# Patient Record
Sex: FEMALE | Race: WHITE | URBAN - METROPOLITAN AREA
[De-identification: names, ages, dates, MRNs, and addresses within clinical notes are randomized per-mention and may not be internally consistent; named-entity substitution may affect disease eponyms.]

---

## 2017-02-28 ENCOUNTER — TRANSFERRED RECORDS (OUTPATIENT)
Dept: HEALTH INFORMATION MANAGEMENT | Facility: CLINIC | Age: 67
End: 2017-02-28

## 2017-03-14 ENCOUNTER — TRANSFERRED RECORDS (OUTPATIENT)
Dept: HEALTH INFORMATION MANAGEMENT | Facility: CLINIC | Age: 67
End: 2017-03-14

## 2017-03-14 LAB
ALT SERPL-CCNC: 16 IU/L (ref 8–45)
AST SERPL-CCNC: 25 IU/L (ref 2–40)
CREAT SERPL-MCNC: 0.8 MG/DL (ref 0.57–1.11)
GFR SERPL CREATININE-BSD FRML MDRD: >60 ML/MIN/1.73M2
GLUCOSE SERPL-MCNC: 95 MG/DL (ref 65–100)
POTASSIUM SERPL-SCNC: 4.1 MMOL/L (ref 3.5–5)

## 2017-03-23 DIAGNOSIS — M81.0 SENILE OSTEOPOROSIS: Primary | ICD-10-CM

## 2017-04-03 ENCOUNTER — INFUSION THERAPY VISIT (OUTPATIENT)
Dept: INFUSION THERAPY | Facility: CLINIC | Age: 67
End: 2017-04-03
Attending: FAMILY MEDICINE
Payer: COMMERCIAL

## 2017-04-03 ENCOUNTER — HOSPITAL ENCOUNTER (OUTPATIENT)
Facility: CLINIC | Age: 67
Setting detail: SPECIMEN
Discharge: HOME | End: 2017-04-03
Attending: FAMILY MEDICINE | Admitting: FAMILY MEDICINE
Payer: COMMERCIAL

## 2017-04-03 VITALS — DIASTOLIC BLOOD PRESSURE: 65 MMHG | TEMPERATURE: 98.2 F | HEART RATE: 67 BPM | SYSTOLIC BLOOD PRESSURE: 112 MMHG

## 2017-04-03 DIAGNOSIS — M81.0 SENILE OSTEOPOROSIS: Primary | ICD-10-CM

## 2017-04-03 DIAGNOSIS — Z92.29 PERSONAL HISTORY OF OTHER DRUG THERAPY: ICD-10-CM

## 2017-04-03 LAB
CALCIUM SERPL-MCNC: 9.4 MG/DL (ref 8.5–10.1)
CREAT SERPL-MCNC: 0.77 MG/DL (ref 0.52–1.04)
GFR SERPL CREATININE-BSD FRML MDRD: 74 ML/MIN/1.7M2

## 2017-04-03 PROCEDURE — 82310 ASSAY OF CALCIUM: CPT | Performed by: FAMILY MEDICINE

## 2017-04-03 PROCEDURE — 96372 THER/PROPH/DIAG INJ SC/IM: CPT

## 2017-04-03 PROCEDURE — 82565 ASSAY OF CREATININE: CPT | Performed by: FAMILY MEDICINE

## 2017-04-03 PROCEDURE — 36415 COLL VENOUS BLD VENIPUNCTURE: CPT

## 2017-04-03 PROCEDURE — 25000128 H RX IP 250 OP 636: Performed by: FAMILY MEDICINE

## 2017-04-03 RX ADMIN — DENOSUMAB 60 MG: 60 INJECTION SUBCUTANEOUS at 12:57

## 2017-04-03 ASSESSMENT — PAIN SCALES - GENERAL: PAINLEVEL: NO PAIN (0)

## 2017-04-03 NOTE — MR AVS SNAPSHOT
"              After Visit Summary   4/3/2017    Annamaria Rosario    MRN: [id number]           Patient Information     Date Of Birth          1950        Visit Information        Provider Department      4/3/2017 11:30 AM  INFUSION CHAIR 6 [facility] and [facility]        Today's Diagnoses     Senile osteoporosis    -  1    Failed oral bisphosphonates           Follow-ups after your visit        Who to contact     If you have questions or need follow up information about today's clinic visit or your schedule please contact [facility] AND [facility] directly at [phone].  Normal or non-critical lab and imaging results will be communicated to you by School Yourselfhart, letter or phone within 4 business days after the clinic has received the results. If you do not hear from us within 7 days, please contact the clinic through Periscape or phone. If you have a critical or abnormal lab result, we will notify you by phone as soon as possible.  Submit refill requests through Periscape or call your pharmacy and they will forward the refill request to us. Please allow 3 business days for your refill to be completed.          Additional Information About Your Visit        MyChart Information     Periscape lets you send messages to your doctor, view your test results, renew your prescriptions, schedule appointments and more. To sign up, go to [email or web address] . Click on \"Log in\" on the left side of the screen, which will take you to the Welcome page. Then click on \"Sign up Now\" on the right side of the page.     You will be asked to enter the access code listed below, as well as some personal information. Please follow the directions to create your username and password.     Your access code is: [email or web address]  Expires: 2017  2:23 PM     Your access code will  in 90 days. If you need help or a new code, please call your [place] clinic or [phone].        Care EveryWhere ID     This " is your Care EveryWhere ID. This could be used by other organizations to access your [place] medical records  [id number]        Your Vitals Were     Pulse Temperature                67 98.2  F (36.8  C) (Oral)           Blood Pressure from Last 3 Encounters:   04/03/17 112/65   08/12/16 104/54   02/12/16 112/66    Weight from Last 3 Encounters:   No data found for Wt              We Performed the Following     Calcium     Creatinine     Road Map Alert     Treatment Conditions        Primary Care Provider    None Specified       No primary provider on file.        Thank you!     Thank you for choosing [facility] AND [facility]  for your care. Our goal is always to provide you with excellent care. Hearing back from our patients is one way we can continue to improve our services. Please take a few minutes to complete the written survey that you may receive in the mail after your visit with us. Thank you!             Your Updated Medication List - Protect others around you: Learn how to safely use, store and throw away your medicines at www.disposemymeds.org.          This list is accurate as of: 4/3/17  2:23 PM.  Always use your most recent med list.                   Brand Name Dispense Instructions for use    ACYCLOVIR PO      Take by mouth as needed       calcium citrate-vitamin D 315-200 MG-UNIT Tabs per tablet    CITRACAL     Take 1 tablet by mouth daily       LEXAPRO PO      Take 7.5 mg by mouth At Bedtime       Multi-vitamin Tabs tablet      Take 1 tablet by mouth daily       OMEGA-3 FISH OIL PO      Take 2 capsules by mouth daily       OMEPRAZOLE PO      Take by mouth every morning       SIMVASTATIN PO

## 2017-04-03 NOTE — PROGRESS NOTES
Infusion Nursing Note:  Annamaria CORMIER Rosario presents today for prolia.    Patient seen by provider today: No   present during visit today: Not Applicable.    Note: N/A.    Intravenous Access:  Labs drawn without difficulty. L AC 23g BF    Treatment Conditions:  Results reviewed, labs MET treatment parameters, ok to proceed with treatment.      Post Infusion Assessment:  Patient tolerated injection without incident.  Site patent and intact, free from redness, edema or discomfort.    Discharge Plan:   Copy of AVS reviewed with patient and/or family.  Patient will return in 6 months for next appointment.  Patient discharged in stable condition accompanied by: self.  Departure Mode: Ambulatory.  Patient will call  to make appt for 6 months from now at [phone]  Malgorzata Oreilly RN

## 2017-10-09 ENCOUNTER — INFUSION THERAPY VISIT (OUTPATIENT)
Dept: INFUSION THERAPY | Facility: CLINIC | Age: 67
End: 2017-10-09
Attending: FAMILY MEDICINE
Payer: COMMERCIAL

## 2017-10-09 VITALS
OXYGEN SATURATION: 100 % | TEMPERATURE: 97.6 F | RESPIRATION RATE: 16 BRPM | DIASTOLIC BLOOD PRESSURE: 52 MMHG | HEART RATE: 73 BPM | SYSTOLIC BLOOD PRESSURE: 96 MMHG

## 2017-10-09 DIAGNOSIS — M81.0 SENILE OSTEOPOROSIS: Primary | ICD-10-CM

## 2017-10-09 DIAGNOSIS — Z92.29 PERSONAL HISTORY OF OTHER DRUG THERAPY: ICD-10-CM

## 2017-10-09 PROCEDURE — 25000128 H RX IP 250 OP 636: Performed by: FAMILY MEDICINE

## 2017-10-09 PROCEDURE — 96372 THER/PROPH/DIAG INJ SC/IM: CPT

## 2017-10-09 RX ADMIN — DENOSUMAB 60 MG: 60 INJECTION SUBCUTANEOUS at 13:28

## 2017-10-09 ASSESSMENT — PAIN SCALES - GENERAL: PAINLEVEL: NO PAIN (0)

## 2017-10-09 NOTE — PROGRESS NOTES
Infusion Nursing Note:  Annamaria Rosario presents today for Prolia.    Patient seen by provider today: No   present during visit today: Not Applicable.    Note: N/A.    Intravenous Access:  No Intravenous access/labs at this visit.    Treatment Conditions:  Not Applicable; Labs from 4/3/2017      Post Infusion Assessment:  Patient tolerated injection without incident.  Site patent and intact, free from redness, edema or discomfort.  No evidence of extravasations.    Discharge Plan:   Discharge instructions reviewed with: Patient.  Patient and/or family verbalized understanding of discharge instructions and all questions answered.  Copy of AVS reviewed with patient and/or family.  Patient will return in 6 months for next appointment.  Patient discharged in stable condition accompanied by: self.  Departure Mode: Ambulatory.    Estephania Jones RN

## 2017-10-09 NOTE — MR AVS SNAPSHOT
"              After Visit Summary   10/9/2017    Annamaria Rosario    MRN: [id number]           Patient Information     Date Of Birth          1950        Visit Information        Provider Department      10/9/2017 1:30 PM  INFUSION CHAIR 8 [facility] and [facility]        Today's Diagnoses     Senile osteoporosis    -  1    Failed oral bisphosphonates           Follow-ups after your visit        Who to contact     If you have questions or need follow up information about today's clinic visit or your schedule please contact [facility] AND [facility] directly at [phone].  Normal or non-critical lab and imaging results will be communicated to you by InterStelNethart, letter or phone within 4 business days after the clinic has received the results. If you do not hear from us within 7 days, please contact the clinic through Upstart Labst or phone. If you have a critical or abnormal lab result, we will notify you by phone as soon as possible.  Submit refill requests through Desigual or call your pharmacy and they will forward the refill request to us. Please allow 3 business days for your refill to be completed.          Additional Information About Your Visit        MyChart Information     Desigual lets you send messages to your doctor, view your test results, renew your prescriptions, schedule appointments and more. To sign up, go to [email or web address] . Click on \"Log in\" on the left side of the screen, which will take you to the Welcome page. Then click on \"Sign up Now\" on the right side of the page.     You will be asked to enter the access code listed below, as well as some personal information. Please follow the directions to create your username and password.     Your access code is: [email or web address]  Expires: 2018  1:33 PM     Your access code will  in 90 days. If you need help or a new code, please call your [place] clinic or [phone].        Care EveryWhere ID     " This is your Care EveryWhere ID. This could be used by other organizations to access your [place] medical records  [id number]        Your Vitals Were     Pulse Temperature Respirations Pulse Oximetry          73 97.6  F (36.4  C) (Oral) 16 100%         Blood Pressure from Last 3 Encounters:   10/09/17 96/52   04/03/17 112/65   08/12/16 104/54    Weight from Last 3 Encounters:   No data found for Wt              Today, you had the following     No orders found for display       Primary Care Provider    None Specified       No primary provider on file.        Equal Access to Services     [facility]: Hadii aad ku hadasho Somagui, waaxda luqadaha, qaybta kaalmada antoninoyamaikol, ophelia marie . So [facility] [phone].    ATENCIÓN: Si habla español, tiene a france disposición servicios gratuitos de asistencia lingüística. Llame al [phone].    We comply with applicable federal civil rights laws and [place] laws. We do not discriminate on the basis of race, color, national origin, age, disability, sex, sexual orientation, or gender identity.            Thank you!     Thank you for choosing [facility] AND [facility]  for your care. Our goal is always to provide you with excellent care. Hearing back from our patients is one way we can continue to improve our services. Please take a few minutes to complete the written survey that you may receive in the mail after your visit with us. Thank you!             Your Updated Medication List - Protect others around you: Learn how to safely use, store and throw away your medicines at www.disposemymeds.org.          This list is accurate as of: 10/9/17  1:33 PM.  Always use your most recent med list.                   Brand Name Dispense Instructions for use Diagnosis    ACYCLOVIR PO      Take by mouth as needed        calcium citrate-vitamin D 315-200 MG-UNIT Tabs per tablet    CITRACAL     Take 1 tablet by mouth daily        LEXAPRO PO       Take 7.5 mg by mouth At Bedtime        Multi-vitamin Tabs tablet      Take 1 tablet by mouth daily        OMEGA-3 FISH OIL PO      Take 2 capsules by mouth daily        OMEPRAZOLE PO      Take by mouth every morning        SIMVASTATIN PO

## 2018-04-04 ENCOUNTER — TELEPHONE (OUTPATIENT)
Dept: INFUSION THERAPY | Facility: CLINIC | Age: 68
End: 2018-04-04

## 2018-04-04 NOTE — TELEPHONE ENCOUNTER
Left voicemail message for patient requesting a return call regarding scheduling an appointment. New Order

## 2018-04-05 DIAGNOSIS — M81.0 SENILE OSTEOPOROSIS: Primary | ICD-10-CM

## 2018-04-05 PROBLEM — Z92.29 PERSONAL HISTORY OF DRUG THERAPY: Status: ACTIVE | Noted: 2018-04-05

## 2018-04-10 ENCOUNTER — HOSPITAL ENCOUNTER (OUTPATIENT)
Facility: CLINIC | Age: 68
Setting detail: SPECIMEN
Discharge: HOME | End: 2018-04-10
Attending: INTERNAL MEDICINE | Admitting: INTERNAL MEDICINE
Payer: COMMERCIAL

## 2018-04-10 ENCOUNTER — INFUSION THERAPY VISIT (OUTPATIENT)
Dept: INFUSION THERAPY | Facility: CLINIC | Age: 68
End: 2018-04-10
Attending: FAMILY MEDICINE
Payer: COMMERCIAL

## 2018-04-10 VITALS
DIASTOLIC BLOOD PRESSURE: 63 MMHG | SYSTOLIC BLOOD PRESSURE: 109 MMHG | TEMPERATURE: 97.5 F | HEART RATE: 65 BPM | RESPIRATION RATE: 16 BRPM

## 2018-04-10 DIAGNOSIS — Z92.29 PERSONAL HISTORY OF DRUG THERAPY: Primary | ICD-10-CM

## 2018-04-10 DIAGNOSIS — M81.0 SENILE OSTEOPOROSIS: ICD-10-CM

## 2018-04-10 LAB
CALCIUM SERPL-MCNC: 9 MG/DL (ref 8.5–10.1)
CREAT SERPL-MCNC: 0.8 MG/DL (ref 0.52–1.04)
GFR SERPL CREATININE-BSD FRML MDRD: 71 ML/MIN/1.7M2
PHOSPHATE SERPL-MCNC: 3.5 MG/DL (ref 2.5–4.5)

## 2018-04-10 PROCEDURE — 84100 ASSAY OF PHOSPHORUS: CPT | Performed by: INTERNAL MEDICINE

## 2018-04-10 PROCEDURE — 25000128 H RX IP 250 OP 636: Performed by: INTERNAL MEDICINE

## 2018-04-10 PROCEDURE — 82310 ASSAY OF CALCIUM: CPT | Performed by: INTERNAL MEDICINE

## 2018-04-10 PROCEDURE — 96372 THER/PROPH/DIAG INJ SC/IM: CPT

## 2018-04-10 PROCEDURE — 36415 COLL VENOUS BLD VENIPUNCTURE: CPT

## 2018-04-10 PROCEDURE — 82565 ASSAY OF CREATININE: CPT | Performed by: INTERNAL MEDICINE

## 2018-04-10 RX ADMIN — DENOSUMAB 60 MG: 60 INJECTION SUBCUTANEOUS at 15:42

## 2018-04-10 ASSESSMENT — PAIN SCALES - GENERAL: PAINLEVEL: NO PAIN (0)

## 2018-04-10 NOTE — MR AVS SNAPSHOT
"              After Visit Summary   4/10/2018    Annamaria Rosario    MRN: [id number]           Patient Information     Date Of Birth          1950        Visit Information        Provider Department      4/10/2018 2:30 PM  INFUSION CHAIR 12 [facility]        Today's Diagnoses     Personal history of drug therapy    -  1    Senile osteoporosis           Follow-ups after your visit        Who to contact     If you have questions or need follow up information about today's clinic visit or your schedule please contact [facility] AND [facility] directly at [phone].  Normal or non-critical lab and imaging results will be communicated to you by Yardsalehart, letter or phone within 4 business days after the clinic has received the results. If you do not hear from us within 7 days, please contact the clinic through Extended [facility]at or phone. If you have a critical or abnormal lab result, we will notify you by phone as soon as possible.  Submit refill requests through Photos to Photos or call your pharmacy and they will forward the refill request to us. Please allow 3 business days for your refill to be completed.          Additional Information About Your Visit        MyChart Information     Photos to Photos lets you send messages to your doctor, view your test results, renew your prescriptions, schedule appointments and more. To sign up, go to [email or web address] to Photos . Click on \"Log in\" on the left side of the screen, which will take you to the Welcome page. Then click on \"Sign up Now\" on the right side of the page.     You will be asked to enter the access code listed below, as well as some personal information. Please follow the directions to create your username and password.     Your access code is: [email or web address]  Expires: 2018  4:23 PM     Your access code will  in 90 days. If you need help or a new code, please call your [place] clinic or [phone].        Care EveryWhere ID  "    This is your Care EveryWhere ID. This could be used by other organizations to access your [place] medical records  [id number]        Your Vitals Were     Pulse Temperature Respirations             65 97.5  F (36.4  C) (Oral) 16          Blood Pressure from Last 3 Encounters:   04/10/18 109/63   10/09/17 96/52   04/03/17 112/65    Weight from Last 3 Encounters:   No data found for Wt              We Performed the Following     Calcium     Creatinine     Phosphorus        Primary Care Provider    None Specified       No primary provider on file.        Equal Access to Services     [facility] : Hadii aad ku hadasho Soomaali, waaxda luqadaha, qaybta kaalmada adeegyada, ophelia marie . So [facility] [phone].    ATENCIÓN: Si habla español, tiene a france disposición servicios gratuitos de asistencia lingüística. Llame al [phone].    We comply with applicable federal civil rights laws and [place] laws. We do not discriminate on the basis of race, color, national origin, age, disability, sex, sexual orientation, or gender identity.            Thank you!     Thank you for choosing [facility] AND [facility]  for your care. Our goal is always to provide you with excellent care. Hearing back from our patients is one way we can continue to improve our services. Please take a few minutes to complete the written survey that you may receive in the mail after your visit with us. Thank you!             Your Updated Medication List - Protect others around you: Learn how to safely use, store and throw away your medicines at www.disposemymeds.org.          This list is accurate as of 4/10/18  4:23 PM.  Always use your most recent med list.                   Brand Name Dispense Instructions for use Diagnosis    ACYCLOVIR PO      Take by mouth as needed        calcium citrate-vitamin D 315-200 MG-UNIT Tabs per tablet    CITRACAL     Take 1 tablet by mouth daily        LEXAPRO PO       Take 7.5 mg by mouth At Bedtime        Multi-vitamin Tabs tablet      Take 1 tablet by mouth daily        OMEGA-3 FISH OIL PO      Take 2 capsules by mouth daily        OMEPRAZOLE PO      Take by mouth every morning        SIMVASTATIN PO

## 2018-04-10 NOTE — PROGRESS NOTES
Infusion Nursing Note:  Annamaria Rosario presents today for labs/prolia.    Patient seen by provider today: No   present during visit today: Not Applicable.    Note: N/A.    Intravenous Access:  Lab draw site lac, Needle type bf, Gauge 23.  Labs drawn without difficulty.    Treatment Conditions:  No results found for: NA                No results found for: POTASSIUM        No results found for: MAG         Lab Results   Component Value Date    CR 0.80 04/10/2018                   Lab Results   Component Value Date    NATALY 9.0 04/10/2018                No results found for: BILITOTAL        No results found for: ALBUMIN                 No results found for: ALT        No results found for: AST  Results reviewed, labs MET treatment parameters, ok to proceed with treatment.      Post Infusion Assessment:  Patient tolerated injection without incident.  Site patent and intact, free from redness, edema or discomfort.    Discharge Plan:   AVS to patient via MYCHART.  Patient will return as [facility] in 6 months for next appointment.   Patient discharged in stable condition accompanied by: self.  Departure Mode: Ambulatory.    Navid Salcido RN

## 2018-10-10 ENCOUNTER — INFUSION THERAPY VISIT (OUTPATIENT)
Dept: INFUSION THERAPY | Facility: CLINIC | Age: 68
End: 2018-10-10
Attending: INTERNAL MEDICINE
Payer: COMMERCIAL

## 2018-10-10 VITALS
RESPIRATION RATE: 20 BRPM | TEMPERATURE: 98.1 F | SYSTOLIC BLOOD PRESSURE: 117 MMHG | HEART RATE: 95 BPM | DIASTOLIC BLOOD PRESSURE: 70 MMHG

## 2018-10-10 DIAGNOSIS — Z92.29 PERSONAL HISTORY OF DRUG THERAPY: Primary | ICD-10-CM

## 2018-10-10 DIAGNOSIS — M81.0 SENILE OSTEOPOROSIS: ICD-10-CM

## 2018-10-10 PROCEDURE — 96372 THER/PROPH/DIAG INJ SC/IM: CPT

## 2018-10-10 PROCEDURE — 25000128 H RX IP 250 OP 636: Performed by: INTERNAL MEDICINE

## 2018-10-10 RX ADMIN — DENOSUMAB 60 MG: 60 INJECTION SUBCUTANEOUS at 14:19

## 2018-10-10 NOTE — PROGRESS NOTES
Infusion Nursing Note:  Annamaria CASA UribeRosario presents today for Prolia.    Patient seen by provider today: No   present during visit today: Not Applicable.    Note: N/A.    Intravenous Access:  No Intravenous access/labs at this visit.    Treatment Conditions:  Not Applicable.      Post Infusion Assessment:  Patient tolerated injection without incident.    Discharge Plan:   Patient declined prescription refills.  Discharge instructions reviewed with: Patient.  Patient verbalized understanding of discharge instructions and all questions answered.  Copy of AVS reviewed with patient.  Patient will return as scheduled for next appointment.  Patient discharged in stable condition accompanied by: self.  Departure Mode: Ambulatory.    Soraida Mills RN

## 2018-10-10 NOTE — MR AVS SNAPSHOT
"              After Visit Summary   10/10/2018    Annamaria Rosario    MRN: [id number]           Patient Information     Date Of Birth          1950        Visit Information        Provider Department      10/10/2018 2:00 PM  INFUSION CHAIR 15 [facility]        Today's Diagnoses     Personal history of drug therapy    -  1    Senile osteoporosis           Follow-ups after your visit        Who to contact     If you have questions or need follow up information about today's clinic visit or your schedule please contact [facility] directly at [phone].  Normal or non-critical lab and imaging results will be communicated to you by Gram Gameshart, letter or phone within 4 business days after the clinic has received the results. If you do not hear from us within 7 days, please contact the clinic through AFINOSt or phone. If you have a critical or abnormal lab result, we will notify you by phone as soon as possible.  Submit refill requests through QuantumSphere or call your pharmacy and they will forward the refill request to us. Please allow 3 business days for your refill to be completed.          Additional Information About Your Visit        MyChart Information     QuantumSphere lets you send messages to your doctor, view your test results, renew your prescriptions, schedule appointments and more. To sign up, go to [email or web address] . Click on \"Log in\" on the left side of the screen, which will take you to the Welcome page. Then click on \"Sign up Now\" on the right side of the page.     You will be asked to enter the access code listed below, as well as some personal information. Please follow the directions to create your username and password.     Your access code is: [email or web address]  Expires: 2019  2:23 PM     Your access code will  in 90 days. If you need help or a new code, please call your [place] clinic or [phone].        Care EveryWhere ID "     This is your Care EveryWhere ID. This could be used by other organizations to access your [place] medical records  [id number]        Your Vitals Were     Pulse Temperature Respirations             95 98.1  F (36.7  C) (Oral) 20          Blood Pressure from Last 3 Encounters:   10/10/18 117/70   04/10/18 109/63   10/09/17 96/52    Weight from Last 3 Encounters:   No data found for Wt              Today, you had the following     No orders found for display       Primary Care Provider    None Specified       No primary provider on file.        Equal Access to Services     [facility] : Hadii aad ku hadasho Soomaali, waaxda luqadaha, qaybta kaalmada adeegyada, ophelia marie . So [facility] [phone].    ATENCIÓN: Si habla español, tiene a france disposición servicios gratuitos de asistencia lingüística. Llame al [phone].    We comply with applicable federal civil rights laws and [place] laws. We do not discriminate on the basis of race, color, national origin, age, disability, sex, sexual orientation, or gender identity.            Thank you!     Thank you for choosing [facility] AND [facility]  for your care. Our goal is always to provide you with excellent care. Hearing back from our patients is one way we can continue to improve our services. Please take a few minutes to complete the written survey that you may receive in the mail after your visit with us. Thank you!             Your Updated Medication List - Protect others around you: Learn how to safely use, store and throw away your medicines at www.disposemymeds.org.          This list is accurate as of 10/10/18  2:23 PM.  Always use your most recent med list.                   Brand Name Dispense Instructions for use Diagnosis    ACYCLOVIR PO      Take by mouth as needed        calcium citrate-vitamin D 315-200 MG-UNIT Tabs per tablet    CITRACAL     Take 1 tablet by mouth daily        LEXAPRO PO      Take  7.5 mg by mouth At Bedtime        Multi-vitamin Tabs tablet      Take 1 tablet by mouth daily        OMEGA-3 FISH OIL PO      Take 2 capsules by mouth daily        OMEPRAZOLE PO      Take by mouth every morning        SIMVASTATIN PO